# Patient Record
Sex: MALE | Race: BLACK OR AFRICAN AMERICAN | NOT HISPANIC OR LATINO | Employment: OTHER | URBAN - METROPOLITAN AREA
[De-identification: names, ages, dates, MRNs, and addresses within clinical notes are randomized per-mention and may not be internally consistent; named-entity substitution may affect disease eponyms.]

---

## 2019-05-13 ENCOUNTER — OFFICE VISIT (OUTPATIENT)
Dept: ORTHOPEDIC SURGERY | Facility: CLINIC | Age: 74
End: 2019-05-13

## 2019-05-13 VITALS — WEIGHT: 178 LBS | BODY MASS INDEX: 26.98 KG/M2 | TEMPERATURE: 97.3 F | HEIGHT: 68 IN

## 2019-05-13 DIAGNOSIS — M25.561 CHRONIC PAIN OF RIGHT KNEE: Primary | ICD-10-CM

## 2019-05-13 DIAGNOSIS — G89.29 CHRONIC PAIN OF RIGHT KNEE: Primary | ICD-10-CM

## 2019-05-13 DIAGNOSIS — M17.11 PRIMARY LOCALIZED OSTEOARTHROSIS OF RIGHT LOWER LEG: ICD-10-CM

## 2019-05-13 PROCEDURE — 99203 OFFICE O/P NEW LOW 30 MIN: CPT | Performed by: ORTHOPAEDIC SURGERY

## 2019-05-13 PROCEDURE — 73562 X-RAY EXAM OF KNEE 3: CPT | Performed by: ORTHOPAEDIC SURGERY

## 2019-05-13 RX ORDER — LISINOPRIL 20 MG/1
TABLET ORAL
COMMUNITY
Start: 2017-12-22

## 2019-05-13 RX ORDER — ISOSORBIDE MONONITRATE 30 MG/1
30 TABLET, EXTENDED RELEASE ORAL DAILY
Refills: 3 | COMMUNITY
Start: 2019-04-28

## 2019-05-13 RX ORDER — GABAPENTIN 300 MG/1
CAPSULE ORAL
Refills: 2 | COMMUNITY
Start: 2019-04-15

## 2019-05-13 RX ORDER — ATORVASTATIN CALCIUM 80 MG/1
TABLET, FILM COATED ORAL
COMMUNITY
Start: 2019-05-09

## 2019-05-13 RX ORDER — METOPROLOL SUCCINATE 50 MG/1
50 TABLET, EXTENDED RELEASE ORAL DAILY
Refills: 0 | COMMUNITY
Start: 2019-04-18

## 2019-05-13 RX ORDER — PIOGLITAZONEHYDROCHLORIDE 15 MG/1
TABLET ORAL
COMMUNITY
Start: 2019-05-12

## 2019-05-13 RX ORDER — TAMSULOSIN HYDROCHLORIDE 0.4 MG/1
1 CAPSULE ORAL DAILY
Refills: 5 | COMMUNITY
Start: 2019-05-04

## 2019-05-13 RX ORDER — ERGOCALCIFEROL 1.25 MG/1
50000 CAPSULE ORAL WEEKLY
Refills: 3 | COMMUNITY
Start: 2019-04-17

## 2019-05-13 NOTE — PROGRESS NOTES
New Right Knee      Patient: Vincent Pinto        YOB: 1945    Medical Record Number: [id number]        Chief Complaints: right knee pain  Chief Complaint   Patient presents with   • Right Knee - Follow-up, Pain           History of Present Illness: This is a 74-year-old male who presents complaining of right knee pain is been ongoing about a year worse recently no history injury change in activity states he did a lot of activity in the next morning noticed it was stiff not but not one particular injury no swelling no night pain primarily lateral current symptoms are mild stiff worse with walking somewhat better with rest his past medical history smart for diabetes prostate cancer stomach ulcers        Allergies:   Allergies   Allergen Reactions   • Ezetimibe-Simvastatin Other (See Comments)     weakness   • Pregabalin Other (See Comments)     weakness       Medications:   Home Medications:  Current Outpatient Medications on File Prior to Visit   Medication Sig   • aspirin 81 MG tablet Take 81 mg by mouth Daily.   • atorvastatin (LIPITOR) 80 MG tablet    • gabapentin (NEURONTIN) 300 MG capsule ONE ONCE DAILY AT BEDTIME   • isosorbide mononitrate (IMDUR) 30 MG 24 hr tablet Take 30 mg by mouth Daily.   • lisinopril (PRINIVIL,ZESTRIL) 20 MG tablet TK 1 T PO QD   • metFORMIN (GLUCOPHAGE) 1000 MG tablet Take 1,000 mg by mouth.   • metoprolol succinate XL (TOPROL-XL) 50 MG 24 hr tablet Take 50 mg by mouth Daily.   • pioglitazone (ACTOS) 15 MG tablet    • tamsulosin (FLOMAX) 0.4 MG capsule 24 hr capsule Take 1 capsule by mouth Daily.   • vitamin D (ERGOCALCIFEROL) 06986 units capsule capsule Take 50,000 Units by mouth 1 (One) Time Per Week.     No current facility-administered medications on file prior to visit.      Current Medications:  Scheduled Meds:  Continuous Infusions:  No current facility-administered medications for this visit.   PRN Meds:.    Past Medical History:   Diagnosis Date   • DM type 2  "(diabetes mellitus, type 2) (CMS/HCC)    • Hyperlipidemia    • Hypertension    • Prostate cancer (CMS/HCC)         Past Surgical History:   Procedure Laterality Date   • ANGIOPLASTY     • HERNIA REPAIR          Social History     Occupational History   • Not on file   Tobacco Use   • Smoking status: Never Smoker   • Smokeless tobacco: Never Used   Substance and Sexual Activity   • Alcohol use: No     Frequency: Never   • Drug use: No   • Sexual activity: Not on file    Social History     Social History Narrative   • Not on file      History reviewed. No pertinent family history.          Review of Systems: 14 point review of systems are remarkable for pertinent positives listed in the chart by the patient the remainder negative    Review of Systems      Physical Exam: 74 y.o. male  General Appearance:    Alert, cooperative, in no acute distress                 Vitals:    05/13/19 1249   Temp: 97.3 °F (36.3 °C)   TempSrc: Temporal   Weight: 80.7 kg (178 lb)   Height: 172.7 cm (68\")      Patient is alert and read ×3 no acute distress appears her above-listed at height weight and age.  Affect is normal respiratory rate is normal unlabored. Heart rate regular rate rhythm, sclera, dentition and hearing are normal for the purpose of this exam.        Ortho Exam Physical exam of the right knee reveals no effusion, no erythema.  It mild loss of extension and full flexion  Patient has mild varus alignment.  They have mild tenderness to palpation about the medial compartment, no tenderness laterally..  The patient has a negative bounce home, negative Martha and a stable ligamentous exam.  Quad tone is reasonable and symmetric.  There are no overlying skin changes no lymphedema no lymphadenopathy.  There is good hip range of motion which is full symmetric and asymptomatic and a normal ankle exam.      Procedures             Radiology:   AP, Lateral and merchant views of the right knee  were ordered/reviewed to cris " pain.  Have no compared to films he does have some narrowing of the medial compartment bilaterally and some mild patellofemoral OA no acute pathology  Imaging Results (most recent)     Procedure Component Value Units Date/Time    XR Knee 3 View Right [[id number]] Resulted:  05/13/19 1246     Updated:  05/13/19 1246    Impression:       .xdd          Assessment/Plan:      Right knee pain with some narrowing of his joint I suspect this is degenerative in origin we talked about options is not really enthusiastic about an injection although I think it would help.  Wants to try physical therapy first which we will try changes in mind his mind and wants an injection I would be happy to work him in

## 2025-08-05 ENCOUNTER — OFFICE VISIT (OUTPATIENT)
Dept: ORTHOPEDIC SURGERY | Facility: CLINIC | Age: 80
End: 2025-08-05
Payer: MEDICARE

## 2025-08-05 VITALS — TEMPERATURE: 97.7 F | HEIGHT: 68 IN | BODY MASS INDEX: 27.86 KG/M2 | WEIGHT: 183.8 LBS

## 2025-08-05 DIAGNOSIS — G89.29 CHRONIC BILATERAL LOW BACK PAIN WITH LEFT-SIDED SCIATICA: ICD-10-CM

## 2025-08-05 DIAGNOSIS — R52 PAIN: ICD-10-CM

## 2025-08-05 DIAGNOSIS — M54.42 CHRONIC BILATERAL LOW BACK PAIN WITH LEFT-SIDED SCIATICA: ICD-10-CM

## 2025-08-05 RX ORDER — ALBUTEROL SULFATE 90 UG/1
2 INHALANT RESPIRATORY (INHALATION)
COMMUNITY
Start: 2025-06-29

## 2025-08-05 RX ORDER — METHYLPREDNISOLONE 4 MG/1
TABLET ORAL
Qty: 21 TABLET | Refills: 0 | Status: SHIPPED | OUTPATIENT
Start: 2025-08-05

## 2025-08-05 RX ORDER — TRIMETHOPRIM 100 MG/1
100 TABLET ORAL 2 TIMES DAILY
COMMUNITY

## 2025-08-26 ENCOUNTER — TREATMENT (OUTPATIENT)
Dept: PHYSICAL THERAPY | Facility: CLINIC | Age: 80
End: 2025-08-26
Payer: MEDICARE

## 2025-08-26 DIAGNOSIS — G89.29 CHRONIC BILATERAL LOW BACK PAIN WITH LEFT-SIDED SCIATICA: Primary | ICD-10-CM

## 2025-08-26 DIAGNOSIS — R26.2 DIFFICULTY WALKING: ICD-10-CM

## 2025-08-26 DIAGNOSIS — M54.42 CHRONIC BILATERAL LOW BACK PAIN WITH LEFT-SIDED SCIATICA: Primary | ICD-10-CM

## 2025-08-26 DIAGNOSIS — Z78.9 DIFFICULTY WITH ACTIVITIES OF DAILY LIVING: ICD-10-CM

## 2025-08-29 ENCOUNTER — TREATMENT (OUTPATIENT)
Dept: PHYSICAL THERAPY | Facility: CLINIC | Age: 80
End: 2025-08-29
Payer: MEDICARE

## 2025-08-29 DIAGNOSIS — R26.2 DIFFICULTY WALKING: ICD-10-CM

## 2025-08-29 DIAGNOSIS — M54.42 CHRONIC BILATERAL LOW BACK PAIN WITH LEFT-SIDED SCIATICA: Primary | ICD-10-CM

## 2025-08-29 DIAGNOSIS — G89.29 CHRONIC BILATERAL LOW BACK PAIN WITH LEFT-SIDED SCIATICA: Primary | ICD-10-CM

## 2025-08-29 DIAGNOSIS — Z78.9 DIFFICULTY WITH ACTIVITIES OF DAILY LIVING: ICD-10-CM

## 2025-08-29 PROCEDURE — 97112 NEUROMUSCULAR REEDUCATION: CPT

## 2025-08-29 PROCEDURE — 97110 THERAPEUTIC EXERCISES: CPT
